# Patient Record
Sex: MALE | Race: WHITE | NOT HISPANIC OR LATINO | URBAN - METROPOLITAN AREA
[De-identification: names, ages, dates, MRNs, and addresses within clinical notes are randomized per-mention and may not be internally consistent; named-entity substitution may affect disease eponyms.]

---

## 2018-03-19 ENCOUNTER — EMERGENCY (EMERGENCY)
Facility: HOSPITAL | Age: 63
LOS: 1 days | Discharge: HOME | End: 2018-03-19
Admitting: EMERGENCY MEDICINE
Payer: COMMERCIAL

## 2018-03-19 VITALS
OXYGEN SATURATION: 100 % | SYSTOLIC BLOOD PRESSURE: 133 MMHG | DIASTOLIC BLOOD PRESSURE: 84 MMHG | HEIGHT: 71 IN | RESPIRATION RATE: 16 BRPM | WEIGHT: 179.9 LBS | HEART RATE: 60 BPM | TEMPERATURE: 98 F

## 2018-03-19 DIAGNOSIS — H92.01 OTALGIA, RIGHT EAR: ICD-10-CM

## 2018-03-19 DIAGNOSIS — M26.621 ARTHRALGIA OF RIGHT TEMPOROMANDIBULAR JOINT: ICD-10-CM

## 2018-03-19 DIAGNOSIS — Z79.82 LONG TERM (CURRENT) USE OF ASPIRIN: ICD-10-CM

## 2018-03-19 DIAGNOSIS — Z79.891 LONG TERM (CURRENT) USE OF OPIATE ANALGESIC: ICD-10-CM

## 2018-03-19 PROCEDURE — 99282 EMERGENCY DEPT VISIT SF MDM: CPT

## 2018-03-19 NOTE — ED PROVIDER NOTE - MEDICAL DECISION MAKING DETAILS
61 y/o M presents to ED c/o R earache.  Pt has pain to TMJ and endorses teeth grinding at night.  Pt advised to take ibuprofen and seek f/u with dentist or max face surgeon for mouth guard fitting and further evaluation.  Pt left ED without discharge instructions.

## 2018-03-19 NOTE — ED PROVIDER NOTE - THROAT FINDINGS
no redness/NO DROOLING/NO TONGUE ELEVATION/NO STRIDOR/no exudate/R TMJ pain with opening mouth fully, no clicking.

## 2018-03-19 NOTE — ED PROVIDER NOTE - OBJECTIVE STATEMENT
61 y/o M presents to ED c/o R ear pain.  Pt states the pain has been constant x 1 week.  He denies sore throat, nasal or sinus congestion, cough, ear drainage, fevers/chills.

## 2023-08-22 NOTE — ED PROVIDER NOTE - PRINCIPAL DIAGNOSIS
Patient states that urgent care switched her antibiotics. The Bactrim (new script) is making her itchy. Patient wants to talk to the nurse to see what she should do. Please return her call.    Arthralgia of right temporomandibular joint